# Patient Record
Sex: FEMALE | Race: WHITE
[De-identification: names, ages, dates, MRNs, and addresses within clinical notes are randomized per-mention and may not be internally consistent; named-entity substitution may affect disease eponyms.]

---

## 2018-02-13 ENCOUNTER — HOSPITAL ENCOUNTER (INPATIENT)
Dept: HOSPITAL 24 - ER | Age: 43
LOS: 2 days | Discharge: HOME | DRG: 641 | End: 2018-02-15
Attending: INTERNAL MEDICINE | Admitting: INTERNAL MEDICINE
Payer: COMMERCIAL

## 2018-02-13 VITALS — BODY MASS INDEX: 20 KG/M2

## 2018-02-13 DIAGNOSIS — E86.0: Primary | ICD-10-CM

## 2018-02-13 DIAGNOSIS — R42: ICD-10-CM

## 2018-02-13 DIAGNOSIS — R79.89: ICD-10-CM

## 2018-02-13 DIAGNOSIS — E87.6: ICD-10-CM

## 2018-02-13 DIAGNOSIS — R53.1: ICD-10-CM

## 2018-02-13 DIAGNOSIS — I10: ICD-10-CM

## 2018-02-13 DIAGNOSIS — R11.2: ICD-10-CM

## 2018-02-13 LAB
ALBUMIN SERPL BCP-MCNC: 4.2 G/DL (ref 3.4–5)
ALP SERPL-CCNC: 128 UNITS/L (ref 46–116)
ALT SERPL W P-5'-P-CCNC: 84 UNITS/L (ref 12–78)
AST SERPL W P-5'-P-CCNC: 107 UNITS/L (ref 15–37)
BACTERIA URNS QL MICRO: (no result) /HPF
BASOPHILS # BLD AUTO: 0.1 X10^3/UL (ref 0–0.1)
BASOPHILS NFR BLD AUTO: 0.4 % (ref 0.2–1)
BILIRUB UR QL STRIP.AUTO: NEGATIVE
BUN SERPL-MCNC: 58 MG/DL (ref 7–18)
CALCIUM ALBUM COR SERPL-SCNC: (no result) MG/DL (ref 8.5–10.1)
CALCIUM ALBUM COR SERPL-SCNC: 141 MMOL/L (ref 136–145)
CALCIUM SERPL-MCNC: 9.7 MG/DL (ref 8.5–10.1)
CHLORIDE SERPL-SCNC: 87 MMOL/L (ref 98–107)
CLARITY UR: (no result)
CO2 SERPL-SCNC: 43.4 MMOL/L (ref 21–32)
COLOR UR AUTO: YELLOW
CREAT SERPL-MCNC: 2.82 MG/DL (ref 0.55–1.02)
EGFR  BLACK RACES: 24 (ref 60–?)
EOSINOPHIL # BLD AUTO: 0 X10^3/UL (ref 0–0.2)
EOSINOPHIL NFR BLD AUTO: 0.2 % (ref 0.9–2.9)
ERYTHROCYTE [DISTWIDTH] IN BLOOD BY AUTOMATED COUNT: 14.3 % (ref 11.6–16.5)
GLUCOSE UR QL STRIP.AUTO: NEGATIVE
HCT VFR BLD AUTO: 38.9 % (ref 36–47)
HGB BLD-MCNC: 12.6 G/DL (ref 12–16)
HYALINE CASTS URNS QL MICRO: (no result) /LPF
KETONES UR QL STRIP.AUTO: NEGATIVE
LEUKOCYTE ESTERASE UR QL STRIP.AUTO: (no result)
LYMPHOCYTES # BLD AUTO: 2.5 X10^3/UL (ref 1.3–2.9)
LYMPHOCYTES NFR BLD AUTO: 17 % (ref 21–51)
MCH RBC QN AUTO: 25.1 PG (ref 27–34)
MCHC RBC AUTO-ENTMCNC: 32.5 G/DL (ref 33–35)
MCV RBC AUTO: 77.2 FL (ref 80–100)
MONOCYTES # BLD AUTO: 1.6 X10^3/UL (ref 0.3–0.8)
MONOCYTES NFR BLD AUTO: 11.1 % (ref 0–13)
MUCOUS THREADS #/AREA URNS HPF: (no result) /HPF
NEUTROPHILS # BLD AUTO: 10.3 X10^3/UL (ref 2.2–4.8)
NEUTROPHILS NFR BLD AUTO: 71.3 % (ref 42–75)
NITRITE UR QL STRIP.AUTO: NEGATIVE
PH UR STRIP.AUTO: 6 [PH] (ref 5–8)
PLAT MORPH BLD: NORMAL
PLATELET # BLD: 426 X10^3/UL (ref 150–450)
PMV BLD AUTO: 8.9 FL (ref 7.4–11)
PROT SERPL-MCNC: 8.2 G/DL (ref 6.4–8.2)
PROT UR QL STRIP.AUTO: (no result)
RBC # BLD AUTO: 5.05 X10^6/UL (ref 3.5–5.4)
RBC # UR STRIP.AUTO: (no result) /UL
RBC #/AREA URNS HPF: (no result) /HPF
RBC MORPH BLD: (no result)
RBC MORPH BLD: SLIGHT
SODIUM SERPL-SCNC: 140 MMOL/L (ref 136–145)
SP GR UR STRIP.AUTO: 1.01 (ref 1–1.03)
SQUAMOUS URNS QL MICRO: (no result) /HPF
UROBILINOGEN UR QL STRIP.AUTO: NORMAL
WBC NRBC COR # BLD AUTO: 14.5 X10^3/UL (ref 3.6–10)

## 2018-02-13 PROCEDURE — 84132 ASSAY OF SERUM POTASSIUM: CPT

## 2018-02-13 PROCEDURE — 83735 ASSAY OF MAGNESIUM: CPT

## 2018-02-13 PROCEDURE — 99283 EMERGENCY DEPT VISIT LOW MDM: CPT

## 2018-02-13 PROCEDURE — 74022 RADEX COMPL AQT ABD SERIES: CPT

## 2018-02-13 PROCEDURE — 80053 COMPREHEN METABOLIC PANEL: CPT

## 2018-02-13 PROCEDURE — 99284 EMERGENCY DEPT VISIT MOD MDM: CPT

## 2018-02-13 PROCEDURE — 96374 THER/PROPH/DIAG INJ IV PUSH: CPT

## 2018-02-13 PROCEDURE — 96365 THER/PROPH/DIAG IV INF INIT: CPT

## 2018-02-13 PROCEDURE — 81001 URINALYSIS AUTO W/SCOPE: CPT

## 2018-02-13 PROCEDURE — 85025 COMPLETE CBC W/AUTO DIFF WBC: CPT

## 2018-02-13 PROCEDURE — 36415 COLL VENOUS BLD VENIPUNCTURE: CPT

## 2018-02-13 PROCEDURE — 51702 INSERT TEMP BLADDER CATH: CPT

## 2018-02-13 PROCEDURE — 87086 URINE CULTURE/COLONY COUNT: CPT

## 2018-02-13 PROCEDURE — 96367 TX/PROPH/DG ADDL SEQ IV INF: CPT

## 2018-02-13 RX ADMIN — SODIUM CHLORIDE SCH ML: 9 INJECTION, SOLUTION INTRAMUSCULAR; INTRAVENOUS; SUBCUTANEOUS at 23:31

## 2018-02-13 RX ADMIN — POTASSIUM BICARBONATE SCH MEQ: 25 TABLET, EFFERVESCENT ORAL at 17:40

## 2018-02-13 RX ADMIN — SODIUM CHLORIDE SCH MLS/HR: 9 INJECTION, SOLUTION INTRAVENOUS at 17:41

## 2018-02-13 NOTE — RAD
HISTORY:  Vomiting, weakness



Study: Acute abdominal series



Comparison: None



Findings:



 The lungs are clear without consolidation, effusion or pneumothorax. The cardiac and mediastinal con
tours are within normal limits.



There is a large volume of well-formed retained stool in the colon and rectum suggesting constipation
. No gross free intraperitoneal air. No pathological soft tissue mass or calcification can be observe
d.  The bony structures are grossly intact. 



IMPRESSION:

 

1. No acute cardiopulmonary disease.

2. Large volume of retained stool in the colon and rectum suggesting constipation.







Reported By:Electronically Signed by JAE GARCÍA MD at 2/13/2018 4:58:41 PM

## 2018-02-13 NOTE — DR.NAUSEAF
HPI





- Time Seen


Time seen: 16:15





- Primary Care Physician


Primary Care Physician: MARIA T TORRES





- Complaints


Chief Complaint Doctors Comments: Patient presents with complaint of nausea and 

vomiting for 3-4 days she has not been able to keep anything down.


Chief Complaint:: PT. C/O NAUSEA/VOMITING THAT BEGAN ON THURSDAY. PT. UNABLE TO 

KEEP ANYTHING DOWN. PT. SEEN PCP YESTERDAY AND WAS SWABBED FOR THE FLU WHICH 

TESTED NEGATIVE. PT. C/O WEAKNESS AND DIZZINESS.





- Source


History Provided: Patient





- Mode of Arrival


Mode of Arrival: Wheelchair





- Timing


Onset of Chief Complaint: 02/08/18





PMH





- PMH


Past Medical History: Yes


Past Medical History: Hypertension, Hyperthyroidism


Past Surgical History: Yes


Surgical History: Cholecystectomy, GYN Surgery, Hysterectomy





- Family History


History of Family Medical Conditions: No





- Social History


Does patient currently use any type of tobacco product: Yes


Have you used tobacco products in the last 12 months: Yes


Type of Tobacco Use: Cigarettes


Does any household member use tobacco: No


Alcohol Use: None


Do you use any recreational Drugs:: No


Lives With: Family


Lives Where: Home





- infectious screening


In the last 2 months have you had wt loss of >10#?: NO


Have you had fever, night sweats or hemotysis?: No


Have you traveled outside the country in the last 6 months?: No


Isolation: Standard





ROS





- Review of Systems


Eyes: No Symptoms Reported


ENTM: No Symptoms Reported


Respiratoy: No Symptoms Reported


Cardiovascular: No Symptoms Reported


Gastrointestinal/Abdominal: No Symptoms Reported


Genitourinary: No Symptoms Reported


Neurological: No Symptoms Reported


Musculoskeletal: No Symptoms Reported


Integumentary: Change in Color (pale)


Hematologic/Lymphatic: No Symptoms Reported


Endocrine: No Symptoms Reported


Psychiatric: No Symptoms Reported


All Other Systems: Reviewed and Negative





PE





- Vital Signs


Vitals: 


 





Temperature                      97.4 F


Pulse Rate                       122


Respiratory Rate                 22


Blood Pressure                   110/68


O2 Sat by Pulse Oximetry         98











- General


Limitations: No Limitations


General Appearance: Alert, In No Apparent Distress





- Head


Head Exam: Normal Inspection, Atraumatic





- Eyes


Eye exam: Normal Appearance, PERRL, EOMI





- ENT


ENT Exam: Normal Exam





- Neck


Neck Exam: Normal Inspection, Full ROM





- Chest


Chest Inspection: Normal Inspection





- Respiratory


Respiratory Exam: Normal Lung Sounds Bilat


Respiratory Exam: Bilateral Clear to Auscultation





- Cardiovascular


Cardiovascular Exam: Regular Rate, Normal Rhythm





- Abdominal Exam


Abdominal Exam: Normal Inspection, Normal Bowel Sounds


Abdominal Tenderness: negative: RUQ, RLQ, LUQ, LLQ, Epigastrium, Suprapubic, 

Diffuse, Mild, Moderate, Severe, Other





- Rectal


Rectal Exam: Deferred





- 


External  Exam: Female: Deferred


: Speculum Exam  (Female): Deferred


: Bimanual Exam (female): Deferred





- Extremities


Extremities Exam: Full ROM.  negative: Normal Capillary Refill (prolong)





- Back


Back Exam: Normal Inspection





- Neurologic


Neurological Exam: Alert, Oriented X3, CN II-XII Intact





- Psychiatric


Psychiatric Exam: Normal Affect





- Skin


Skin Exam: Warm, Dry, Intact





Course





- Reevaluation


1st: Improved (decreased in vomiting, continued nausea)





- Consultation


Called: 17:15 (Dr Rodriguez agreed to admit for further evaluation and treatment)





ROR





- Labs Reviewed


Result Diagrams: 


 02/13/18 16:08





 02/13/18 16:08


Laboratory: 


 











WBC  14.5 X10^3/uL (3.6-10.0)  H  02/13/18  16:08    


 


RBC  5.05 X10^6/uL (3.5-5.4)   02/13/18  16:08    


 


Hgb  12.6 g/dL (12.0-16.0)   02/13/18  16:08    


 


Hct  38.9 % (36.0-47.0)   02/13/18  16:08    


 


MCV  77.2 fL (80.0-100.0)  L  02/13/18  16:08    


 


MCH  25.1 pg (27.0-34.0)  L  02/13/18  16:08    


 


MCHC  32.5 g/dL (33.0-35.0)  L  02/13/18  16:08    


 


RDW  14.3 % (11.6-16.5)   02/13/18  16:08    


 


Plt Count  426 X10^3/uL (150.0-450.0)   02/13/18  16:08    


 


Plt Count Comment  Adequate  (ADEQUATE)   02/13/18  16:08    


 


MPV  8.9 fL (7.4-11.0)   02/13/18  16:08    


 


Neut %  71.3 % (42.0-75.0)   02/13/18  16:08    


 


Lymph %  17.0 % (21.0-51.0)  L  02/13/18  16:08    


 


Mono %  11.1 % (0.0-13.0)   02/13/18  16:08    


 


Eos %  0.2 % (0.9-2.9)  L  02/13/18  16:08    


 


Baso %  0.4 % (0.2-1.0)   02/13/18  16:08    


 


Neut #  10.3 x10^3/uL (2.2-4.8)  H  02/13/18  16:08    


 


Lymph #  2.5 X10^3/uL (1.3-2.9)   02/13/18  16:08    


 


Mono #  1.6 x10^3/uL (0.3-0.8)  H  02/13/18  16:08    


 


Eos #  0.0 x10^3/uL (0.0-0.2)   02/13/18  16:08    


 


Baso #  0.1 X10^3/uL (0.0-0.1)   02/13/18  16:08    


 


Absolute Nucleated RBC  0.0 /100WBC  02/13/18  16:08    


 


Plt Morphology Comment  Normal  (NORMAL)   02/13/18  16:08    


 


RBC Morphology  Abnormal  (NORMAL)  A  02/13/18  16:08    


 


Hypochromasia  Slight  A  02/13/18  16:08    


 


Sodium  140 mmol/L (136-145)   02/13/18  16:08    


 


Corrected Sodium  141 mmol/L (136-145)   02/13/18  16:08    


 


Potassium  2.9 mmol/L (3.5-5.1)  L*  02/13/18  16:08    


 


Chloride  87 mmol/L ()  L  02/13/18  16:08    


 


Carbon Dioxide  43.4 mmol/L (21-32)  H*  02/13/18  16:08    


 


BUN  58 mg/dL (7-18)  H  02/13/18  16:08    


 


Creatinine  2.82 mg/dL (0.55-1.02)  H  02/13/18  16:08    


 


Est GFR (MDRD) Af Amer  24  (>60)  L  02/13/18  16:08    


 


Est GFR (MDRD) Non-Af  20  (>60)  L  02/13/18  16:08    


 


Glucose  149 mg/dL (65-99)  H  02/13/18  16:08    


 


Calcium  9.7 mg/dL (8.5-10.1)   02/13/18  16:08    


 


Corrected Calcium  TNP   02/13/18  16:08    


 


Total Bilirubin  0.30 mg/dL (0.2-1.0)   02/13/18  16:08    


 


AST  107 Units/L (15-37)  H  02/13/18  16:08    


 


ALT  84 Units/L (12-78)  H  02/13/18  16:08    


 


Alkaline Phosphatase  128 Units/L ()  H  02/13/18  16:08    


 


Total Protein  8.2 g/dL (6.4-8.2)   02/13/18  16:08    


 


Albumin  4.2 g/dL (3.4-5.0)   02/13/18  16:08    


 


Globulin  4.0 g/dL (2.5-4.5)   02/13/18  16:08    


 


Albumin/Globulin Ratio  1.1 Ratio (1.1-2.1)   02/13/18  16:08    














- Diagnosis


Discharge Problem: 


 Prerenal azotemia, Hypokalemia, Nausea and vomiting in adult patient








- Discharge Plan


Condition: Stable





- Follow ups/Referrals


Follow ups/Referrals: 


BETH TORRES [Primary Care Provider] - 3 days





- Instructions

## 2018-02-14 LAB
ALBUMIN SERPL BCP-MCNC: 2.9 G/DL (ref 3.4–5)
ALP SERPL-CCNC: 83 UNITS/L (ref 46–116)
ALT SERPL W P-5'-P-CCNC: 52 UNITS/L (ref 12–78)
AST SERPL W P-5'-P-CCNC: 45 UNITS/L (ref 15–37)
BASOPHILS # BLD AUTO: 0 X10^3/UL (ref 0–0.1)
BASOPHILS NFR BLD AUTO: 0.4 % (ref 0.2–1)
BUN SERPL-MCNC: 42 MG/DL (ref 7–18)
CALCIUM ALBUM COR SERPL-SCNC: (no result) MMOL/L (ref 136–145)
CALCIUM ALBUM COR SERPL-SCNC: 8.6 MG/DL (ref 8.5–10.1)
CALCIUM SERPL-MCNC: 7.7 MG/DL (ref 8.5–10.1)
CHLORIDE SERPL-SCNC: 105 MMOL/L (ref 98–107)
CO2 SERPL-SCNC: 30.3 MMOL/L (ref 21–32)
CREAT SERPL-MCNC: 1.1 MG/DL (ref 0.55–1.02)
EGFR  BLACK RACES: > 60 (ref 60–?)
EOSINOPHIL # BLD AUTO: 0.1 X10^3/UL (ref 0–0.2)
EOSINOPHIL NFR BLD AUTO: 1.3 % (ref 0.9–2.9)
ERYTHROCYTE [DISTWIDTH] IN BLOOD BY AUTOMATED COUNT: 14.5 % (ref 11.6–16.5)
HCT VFR BLD AUTO: 30 % (ref 36–47)
HGB BLD-MCNC: 9.7 G/DL (ref 12–16)
LYMPHOCYTES # BLD AUTO: 2.3 X10^3/UL (ref 1.3–2.9)
LYMPHOCYTES NFR BLD AUTO: 22.9 % (ref 21–51)
MCH RBC QN AUTO: 25.3 PG (ref 27–34)
MCHC RBC AUTO-ENTMCNC: 32.3 G/DL (ref 33–35)
MCV RBC AUTO: 78.3 FL (ref 80–100)
MONOCYTES # BLD AUTO: 1.1 X10^3/UL (ref 0.3–0.8)
MONOCYTES NFR BLD AUTO: 11.2 % (ref 0–13)
NEUTROPHILS # BLD AUTO: 6.5 X10^3/UL (ref 2.2–4.8)
NEUTROPHILS NFR BLD AUTO: 64.2 % (ref 42–75)
PLAT MORPH BLD: NORMAL
PLATELET # BLD: 284 X10^3/UL (ref 150–450)
PMV BLD AUTO: 8.9 FL (ref 7.4–11)
PROT SERPL-MCNC: 5.8 G/DL (ref 6.4–8.2)
RBC # BLD AUTO: 3.84 X10^6/UL (ref 3.5–5.4)
RBC MORPH BLD: (no result)
SODIUM SERPL-SCNC: 143 MMOL/L (ref 136–145)
WBC NRBC COR # BLD AUTO: 10.1 X10^3/UL (ref 3.6–10)

## 2018-02-14 RX ADMIN — SODIUM CHLORIDE SCH MLS/HR: 9 INJECTION, SOLUTION INTRAVENOUS at 03:19

## 2018-02-14 RX ADMIN — LISINOPRIL SCH: 5 TABLET ORAL at 11:49

## 2018-02-14 RX ADMIN — SODIUM CHLORIDE SCH MLS/HR: 9 INJECTION, SOLUTION INTRAVENOUS at 09:24

## 2018-02-14 RX ADMIN — SODIUM CHLORIDE SCH: 9 INJECTION, SOLUTION INTRAMUSCULAR; INTRAVENOUS; SUBCUTANEOUS at 21:09

## 2018-02-14 RX ADMIN — MAGNESIUM HYDROXIDE SCH ML: 400 SUSPENSION ORAL at 17:18

## 2018-02-14 RX ADMIN — MAGNESIUM HYDROXIDE SCH: 400 SUSPENSION ORAL at 13:32

## 2018-02-14 RX ADMIN — SODIUM CHLORIDE SCH ML: 9 INJECTION, SOLUTION INTRAMUSCULAR; INTRAVENOUS; SUBCUTANEOUS at 14:00

## 2018-02-14 RX ADMIN — RANITIDINE HYDROCHLORIDE SCH: 150 TABLET, FILM COATED ORAL at 11:50

## 2018-02-14 RX ADMIN — POTASSIUM CHLORIDE AND SODIUM CHLORIDE SCH MLS/HR: 450; 150 INJECTION, SOLUTION INTRAVENOUS at 14:00

## 2018-02-14 RX ADMIN — SODIUM CHLORIDE SCH: 9 INJECTION, SOLUTION INTRAMUSCULAR; INTRAVENOUS; SUBCUTANEOUS at 06:04

## 2018-02-14 RX ADMIN — POTASSIUM BICARBONATE SCH: 25 TABLET, EFFERVESCENT ORAL at 08:52

## 2018-02-14 RX ADMIN — POLYETHYLENE GLYCOL 3350 SCH GM: 17 POWDER, FOR SOLUTION ORAL at 09:39

## 2018-02-14 RX ADMIN — POTASSIUM CHLORIDE AND SODIUM CHLORIDE SCH MLS/HR: 450; 150 INJECTION, SOLUTION INTRAVENOUS at 21:06

## 2018-02-14 RX ADMIN — MAGNESIUM HYDROXIDE SCH ML: 400 SUSPENSION ORAL at 13:30

## 2018-02-14 RX ADMIN — DOCUSATE SODIUM SCH MG: 100 CAPSULE, LIQUID FILLED ORAL at 21:05

## 2018-02-14 RX ADMIN — MAGNESIUM HYDROXIDE SCH ML: 400 SUSPENSION ORAL at 21:05

## 2018-02-14 RX ADMIN — DOCUSATE SODIUM SCH MG: 100 CAPSULE, LIQUID FILLED ORAL at 09:38

## 2018-02-14 RX ADMIN — RANITIDINE HYDROCHLORIDE SCH MG: 150 TABLET, FILM COATED ORAL at 21:05

## 2018-02-14 RX ADMIN — MAGNESIUM HYDROXIDE SCH ML: 400 SUSPENSION ORAL at 09:38

## 2018-02-14 RX ADMIN — ONDANSETRON PRN MG: 2 INJECTION, SOLUTION INTRAMUSCULAR; INTRAVENOUS at 09:24

## 2018-02-14 NOTE — DR.H&P
H&P





- History & Physical for Day of:


H&P Date: 18





- Chief Complaint


Chief Complaint: nausea, vomiting





- Allergies


Allergies/Adverse Reactions: 


 Allergies











Allergy/AdvReac Type Severity Reaction Status Date / Time


 


No Known Drug Allergies Allergy   Verified 18 16:14














- History of Present Illness


History of Present Illness:  is a 42 year old patient of [facility] [employer] 

who presented to the emergency room with complaints of nausea and vomiting. 

Patient reports that symptoms have been present for 3-4 days and has been 

unable to keep anything down. Patient reports that she was seen by her PCP 

yesterday and was swabbed for the flu, in which she tested negative. Associated 

symptoms are weakness and dizziness. On examination, Patient is noted to be 

pale in color. Heart rate is note to be rapid, regular. Bilateral lungs are 

noted to be clear to auscultation. Abdomen is round, soft, and noted with mild, 

diffuse tenderness on palpation. Skin turgor is decreased and capillary refill 

is noted to be prolonged. There is normal range of motion noted to all 

extremities. On arrival, vitals were 97.4, 122, 22, 98%RA, 110/68. Labs were 

obtained. Abnormal Labs include the following: WBC 14.5, MCV 77.2, MCH 25.14, 

MCHC 32.5, Lymph% 17.0, Eos% 0.2, Neut# 10.3, Mono# 1.6, RBC Morphology 

Abnormal A, Hypochromasia Slight A, Potassium 2.9, Chloride 87, Carbon Dioxide 

43.4, BUN 58, Creatinine 2.82, GFR(AA) 24, GFR(non) 20, Glucose 149, , 

ALT 84, Alk Phos 128. Urinalysis reported: Endy Slightly Hazy, Protein 2+, 

Occult Blood 1+, Leuk Berna 1+, RBC 0-2, WBC 0-4, Squam Epith Cells Rare, 

Bacteria Trace, Hyaline Casts Moderate, Mucus Many; Urine culture set up at 

this time. A urine culture is pending. An abdomen xray was obtained and reported

: No acute cardiopulmonary disease. Large volume of retained stool in the colon 

and rectum suggesting constipation. A normal saline bolus was given. Potassium 

was supplemented with 50meq of oral potassium, as well as adding potassium in 

her IV fluids. We admitted patient for further treatment and evaluation for 

dehydration and prerenal azotemia. We will continue with gentle IV hydration 

and follow up with additional labs in the morning.





- Past Medical History


Past Medical History: Hypertension, Hyperthyroidism





- Past Surgical History


Surgical History: Appendectomy, , Cholecystectomy, Hysterectomy





- Social History


Does patient currently use any type of tobacco product: Yes


Have you used tobacco products in the last 12 months: Yes


Type of Tobacco Use: Cigarettes


Does any household member use tobacco: No


Alcohol Use: None


Drug Use: None





- Medications


Home Medications: 


 





Levothyroxine Sodium [SYNTHROID 50 mcg *] 1 tab PO DAILY 18 [History 

Confirmed 18]


Lisinopril [ZESTRIL *] 1 tab PO DAILY 18 [History Confirmed 18]


Ondansetron [Zofran Odt] 1 tab SL Q6HR PRN 18 [History Confirmed 18]


Ranitidine HCl [ZANTAC  MG *] 1 tab PO BID 18 [History Confirmed ]











- Review of Systems


Constitutional: Weakness.  denies: Fever, Chills, Sweats


Eyes: No Symptoms Reported.  denies: See HPI, Pain, Vision Change, Conjunctivae 

Inflammation, Eyelid Inflammation, Redness, Other


ENT: No Symptoms Reported.  denies: See HPI, Ear Pain, Ear Discharge, Nose Pain

, Nose Discharge, Nose Congestion, Mouth Pain, Mouth Swelling, Throat Pain, 

Throat Swelling, Other


Respiratory: No Symptoms Reported.  denies: See HPI, Cough, Dry, Shortness of 

Breath, Hemoptysis, SOB with Excertion, Pleuritic Pain, Sputum, Wheezing, Other


Cardiovascular: No Symptoms Reported.  denies: Chest Pain, See HPI, Palpitations

, Orthopnea, Paroxysmal Noc. Dyspnea, Edema, Light Headedness, Other


Gastrointestinal: See HPI, Nausea, Vomiting, Abdominal Pain


Genitourinary: No Symptoms Reported.  denies: See HPI, Dysuria, Frequency, 

Incontinence, Hematuria, Retention, Other


Musculoskeletal: No Symptoms Reported.  denies: See HPI, Shoulder Pain, Arm Pain

, Back Pain, Hand Pain, Leg Pain, Foot Pain, Neck Pain, Other


Skin: No Symptoms Reported.  denies: See HPI, Rash, Lesions, Jaundice, Bruising

, Wound, Ecchymosis, Other


Neurological: Weakness





- Physical Exam


Vital Signs: 


 





Temperature                      98.8 F


Pulse Rate [Right Brachial]      94


Pulse Rate                       122


Respiratory Rate                 18


Blood Pressure [Right Arm]       132/60


Blood Pressure                   110/68


O2 Sat by Pulse Oximetry         99








Oriented: Normal.  negative: Time, Person, Place, Not Oriented, Unable to test, 

Other


Eyes: Normal.  negative: Blurred Vision, Diplopia, Discharge, Pain, Redness, 

Photophobia, Other


Ear: Normal.  negative: Right, Left, Swelling, Ecchymosis, Hemotypanum, Abrasion

, Laceration


Nose: Normal.  negative: Injected, Discharge, Blood, Other


Throat: Normal.  negative: Tonsillar Hypertrophy, Red, Exudate, Dry, Other


Respiratory: Clear Throughout


Cardiovascular: Tachycardia


: Normal


Auscultation: Bowel Sounds: Normal


Palpation: Normal


Tenderness: Diffuse, Mild


Skin: Decreased Turgur


Musculoskeletal: Normal


Psychiatric: Normal


Mood Description: Calm


Affect: Normal


Speech Pattern: Clear





- Assessment/Plan


(1) Prerenal azotemia


Status: Acute   


Plan: normal saline with 20meq kcl at 200ml/hr, continue to monitor   





(2) Hypokalemia


Status: Acute   


Plan: normal saline with 20meq kcl at 200ml/hr, potassium protocol, continue to 

monitor   





(3) Nausea and vomiting in adult patient


Status: Acute   


Plan: zofran 4mg iv q8h prn, phenergan 12.5mg iv q6h prn, continue to monitor

## 2018-02-15 VITALS — DIASTOLIC BLOOD PRESSURE: 75 MMHG | SYSTOLIC BLOOD PRESSURE: 128 MMHG

## 2018-02-15 LAB
ALBUMIN SERPL BCP-MCNC: 3 G/DL (ref 3.4–5)
ALP SERPL-CCNC: 80 UNITS/L (ref 46–116)
ALT SERPL W P-5'-P-CCNC: 37 UNITS/L (ref 12–78)
AST SERPL W P-5'-P-CCNC: 27 UNITS/L (ref 15–37)
BASOPHILS # BLD AUTO: 0.1 X10^3/UL (ref 0–0.1)
BASOPHILS NFR BLD AUTO: 1.1 % (ref 0.2–1)
BUN SERPL-MCNC: 18 MG/DL (ref 7–18)
CALCIUM ALBUM COR SERPL-SCNC: (no result) MMOL/L (ref 136–145)
CALCIUM ALBUM COR SERPL-SCNC: 9 MG/DL (ref 8.5–10.1)
CALCIUM SERPL-MCNC: 8.2 MG/DL (ref 8.5–10.1)
CHLORIDE SERPL-SCNC: 107 MMOL/L (ref 98–107)
CO2 SERPL-SCNC: 23.3 MMOL/L (ref 21–32)
CREAT SERPL-MCNC: 0.43 MG/DL (ref 0.55–1.02)
EGFR  BLACK RACES: > 60 (ref 60–?)
EOSINOPHIL # BLD AUTO: 0.5 X10^3/UL (ref 0–0.2)
EOSINOPHIL NFR BLD AUTO: 6.2 % (ref 0.9–2.9)
ERYTHROCYTE [DISTWIDTH] IN BLOOD BY AUTOMATED COUNT: 14.3 % (ref 11.6–16.5)
HCT VFR BLD AUTO: 27.8 % (ref 36–47)
HGB BLD-MCNC: 9.1 G/DL (ref 12–16)
LYMPHOCYTES # BLD AUTO: 2.5 X10^3/UL (ref 1.3–2.9)
LYMPHOCYTES NFR BLD AUTO: 31.1 % (ref 21–51)
MCH RBC QN AUTO: 25.7 PG (ref 27–34)
MCHC RBC AUTO-ENTMCNC: 32.6 G/DL (ref 33–35)
MCV RBC AUTO: 78.8 FL (ref 80–100)
MONOCYTES # BLD AUTO: 0.7 X10^3/UL (ref 0.3–0.8)
MONOCYTES NFR BLD AUTO: 8.5 % (ref 0–13)
NEUTROPHILS # BLD AUTO: 4.3 X10^3/UL (ref 2.2–4.8)
NEUTROPHILS NFR BLD AUTO: 53.1 % (ref 42–75)
PLAT MORPH BLD: NORMAL
PLATELET # BLD: 274 X10^3/UL (ref 150–450)
PMV BLD AUTO: 9 FL (ref 7.4–11)
PROT SERPL-MCNC: 6 G/DL (ref 6.4–8.2)
RBC # BLD AUTO: 3.52 X10^6/UL (ref 3.5–5.4)
RBC MORPH BLD: (no result)
RBC MORPH BLD: SLIGHT
SODIUM SERPL-SCNC: 139 MMOL/L (ref 136–145)
WBC NRBC COR # BLD AUTO: 8.2 X10^3/UL (ref 3.6–10)

## 2018-02-15 RX ADMIN — SODIUM CHLORIDE SCH: 9 INJECTION, SOLUTION INTRAMUSCULAR; INTRAVENOUS; SUBCUTANEOUS at 06:20

## 2018-02-15 RX ADMIN — ONDANSETRON PRN MG: 2 INJECTION, SOLUTION INTRAMUSCULAR; INTRAVENOUS at 08:16

## 2018-02-15 RX ADMIN — POTASSIUM BICARBONATE SCH MEQ: 25 TABLET, EFFERVESCENT ORAL at 08:16

## 2018-02-15 RX ADMIN — RANITIDINE HYDROCHLORIDE SCH MG: 150 TABLET, FILM COATED ORAL at 08:15

## 2018-02-15 RX ADMIN — POLYETHYLENE GLYCOL 3350 SCH GM: 17 POWDER, FOR SOLUTION ORAL at 08:15

## 2018-02-15 RX ADMIN — LISINOPRIL SCH MG: 5 TABLET ORAL at 08:16

## 2018-02-15 RX ADMIN — DOCUSATE SODIUM SCH MG: 100 CAPSULE, LIQUID FILLED ORAL at 08:15

## 2018-02-15 RX ADMIN — MAGNESIUM HYDROXIDE SCH ML: 400 SUSPENSION ORAL at 08:16

## 2018-02-15 RX ADMIN — POTASSIUM CHLORIDE AND SODIUM CHLORIDE SCH MLS/HR: 450; 150 INJECTION, SOLUTION INTRAVENOUS at 06:19
